# Patient Record
Sex: MALE | Race: BLACK OR AFRICAN AMERICAN | URBAN - METROPOLITAN AREA
[De-identification: names, ages, dates, MRNs, and addresses within clinical notes are randomized per-mention and may not be internally consistent; named-entity substitution may affect disease eponyms.]

---

## 2017-04-27 ENCOUNTER — HOSPITAL ENCOUNTER (EMERGENCY)
Facility: CLINIC | Age: 37
Discharge: HOME | End: 2017-04-27
Attending: EMERGENCY MEDICINE | Admitting: EMERGENCY MEDICINE
Payer: COMMERCIAL

## 2017-04-27 VITALS
OXYGEN SATURATION: 100 % | DIASTOLIC BLOOD PRESSURE: 86 MMHG | TEMPERATURE: 98.2 F | RESPIRATION RATE: 16 BRPM | SYSTOLIC BLOOD PRESSURE: 138 MMHG

## 2017-04-27 DIAGNOSIS — F10.220 ACUTE ALCOHOLIC INTOXICATION IN ALCOHOLISM, UNCOMPLICATED (H): ICD-10-CM

## 2017-04-27 LAB — ALCOHOL BREATH TEST: 0.28 (ref 0–0.01)

## 2017-04-27 PROCEDURE — 99285 EMERGENCY DEPT VISIT HI MDM: CPT | Performed by: EMERGENCY MEDICINE

## 2017-04-27 PROCEDURE — 82075 ASSAY OF BREATH ETHANOL: CPT | Performed by: EMERGENCY MEDICINE

## 2017-04-27 PROCEDURE — 99284 EMERGENCY DEPT VISIT MOD MDM: CPT | Mod: Z6 | Performed by: EMERGENCY MEDICINE

## 2017-04-27 PROCEDURE — 25000132 ZZH RX MED GY IP 250 OP 250 PS 637: Performed by: EMERGENCY MEDICINE

## 2017-04-27 RX ORDER — LANOLIN ALCOHOL/MO/W.PET/CERES
100 CREAM (GRAM) TOPICAL ONCE
Status: COMPLETED | OUTPATIENT
Start: 2017-04-27 | End: 2017-04-27

## 2017-04-27 RX ORDER — MAGNESIUM OXIDE 400 MG/1
800 TABLET ORAL ONCE
Status: COMPLETED | OUTPATIENT
Start: 2017-04-27 | End: 2017-04-27

## 2017-04-27 RX ORDER — FOLIC ACID 1 MG/1
1 TABLET ORAL ONCE
Status: COMPLETED | OUTPATIENT
Start: 2017-04-27 | End: 2017-04-27

## 2017-04-27 RX ORDER — MULTIVITAMIN,THERAPEUTIC
1 TABLET ORAL ONCE
Status: COMPLETED | OUTPATIENT
Start: 2017-04-27 | End: 2017-04-27

## 2017-04-27 RX ADMIN — Medication 100 MG: at 05:57

## 2017-04-27 RX ADMIN — THERA TABS 1 TABLET: TAB at 05:58

## 2017-04-27 RX ADMIN — FOLIC ACID 1 MG: 1 TABLET ORAL at 05:58

## 2017-04-27 RX ADMIN — MAGNESIUM OXIDE TAB 400 MG (241.3 MG ELEMENTAL MG) 800 MG: 400 (241.3 MG) TAB at 05:58
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
## 2017-04-27 NOTE — ED AVS SNAPSHOT
[facility], Emergency Department    [address]    [place] [address]    Phone:  [phone]    Fax:  [phone]                                       Sayra Dubose   MRN: [id number]    Department:  [facility], Emergency Department   Date of Visit:  4/27/2017           After Visit Summary Signature Page     I have received my discharge instructions, and my questions have been answered. I have discussed any challenges I see with this plan with the nurse or doctor.    ..........................................................................................................................................  Patient/Patient Representative Signature      ..........................................................................................................................................  Patient Representative Print Name and Relationship to Patient    ..................................................               ................................................  Date                                            Time    ..........................................................................................................................................  Reviewed by Signature/Title    ...................................................              ..............................................  Date                                                            Time

## 2017-04-27 NOTE — ED NOTES
Patient signed out to me by my partner pending sobriety and disposition. Patient is awake, alert, and clinically sober. Gait is normal. He ate breakfast without any difficulty. He is not interested in detox. No suicidal or homicidal thoughts. At this point he is stable for discharge.     Tala Freeman MD  04/27/17 8453

## 2017-04-27 NOTE — DISCHARGE INSTRUCTIONS
Please call [place] chemical dependency intake line at [phone] if you want help managing your alcoholism.

## 2017-04-27 NOTE — ED AVS SNAPSHOT
[facility], Emergency Department    [address]    [place] [address]    Phone:  [phone]    Fax:  [phone]                                       Sayra Dubose   MRN: [id number]    Department:  [facility], Emergency Department   Date of Visit:  4/27/2017           Patient Information     Date Of Birth          1980        Your diagnoses for this visit were:     Acute alcoholic intoxication in alcoholism, uncomplicated (H)        You were seen by Arnie Araiza MD and Tala Freeman MD.      Follow-up Information     Follow up with Benton Castro MD.    Specialty:  Student in organized health care education/training program    Contact information:    [facility]  [address]  [place] [address] [phone]          Discharge Instructions       Please call [facility] chemical dependency intake line at [phone] if you want help managing your alcoholism.    24 Hour Appointment Hotline       To make an appointment at any [facility] clinic, call [phone] ([phone]). If you don't have a family doctor or clinic, we will help you find one. [facility] clinics are conveniently located to serve the needs of you and your family.             Review of your medicines      Our records show that you are taking the medicines listed below. If these are incorrect, please call your family doctor or clinic.        Dose / Directions Last dose taken    TRAZODONE HCL PO   Dose:  50 mg        Take 50 mg by mouth At Bedtime   Refills:  0                Procedures and tests performed during your visit     Alcohol breath test POCT      Orders Needing Specimen Collection     None      Pending Results     No orders found from 4/25/2017 to 4/28/2017.            Pending Culture Results     No orders found from 4/25/2017 to 4/28/2017.            Thank you for choosing [facility]       Thank you for choosing [facility] for your care. Our goal is always to provide you with excellent care.  "Hearing back from our patients is one way we can continue to improve our services. Please take a few minutes to complete the written survey that you may receive in the mail after you visit with us. Thank you!        [employer] Information     [employer] lets you send messages to your doctor, view your test results, renew your prescriptions, schedule appointments and more. To sign up, go to [email or web address] . Click on \"Log in\" on the left side of the screen, which will take you to the Welcome page. Then click on \"Sign up Now\" on the right side of the page.     You will be asked to enter the access code listed below, as well as some personal information. Please follow the directions to create your username and password.     Your access code is: [email or web address]  Expires: 2017  9:39 AM     Your access code will  in 90 days. If you need help or a new code, please call your [facility] clinic or [phone].        Care EveryWhere ID     This is your Care EveryWhere ID. This could be used by other organizations to access your [facility] medical records  [id number]        After Visit Summary       This is your record. Keep this with you and show to your community pharmacist(s) and doctor(s) at your next visit.                  "